# Patient Record
Sex: MALE | Race: WHITE | NOT HISPANIC OR LATINO | Employment: OTHER | URBAN - METROPOLITAN AREA
[De-identification: names, ages, dates, MRNs, and addresses within clinical notes are randomized per-mention and may not be internally consistent; named-entity substitution may affect disease eponyms.]

---

## 2023-03-24 ENCOUNTER — TELEPHONE (OUTPATIENT)
Dept: CARDIOLOGY | Facility: CLINIC | Age: 83
End: 2023-03-24
Payer: MEDICARE

## 2023-03-24 NOTE — TELEPHONE ENCOUNTER
Spoke to the patient daughter to scheduled a appointment for his high blood pressure. on 05/05/2023.    ----- Message from Jessica Lares sent at 3/24/2023  2:57 PM CDT -----  Contact: Myla/ Daughter  Type:  Sooner Apoointment Request    Caller is requesting a sooner appointment.  Caller declined first available appointment listed below.  Caller will not accept being placed on the waitlist and is requesting a message be sent to doctor.  Name of Caller: Myla  When is the first available appointment? 9/2023  Symptoms: Heart Concern, high blood pressure, blocked arteries  Would the patient rather a call back or a response via [facility]? Call  Best Call Back Number: [phone]  Additional Information: [facility] is preferred but [facility] is okay as well. She stated that Dr. Bland informed her that he'd see the patient.

## 2023-05-18 DIAGNOSIS — I48.3 TYPICAL ATRIAL FLUTTER: Primary | ICD-10-CM

## 2023-05-19 ENCOUNTER — HOSPITAL ENCOUNTER (OUTPATIENT)
Dept: CARDIOLOGY | Facility: HOSPITAL | Age: 83
Discharge: HOME | End: 2023-05-19
Attending: INTERNAL MEDICINE
Payer: MEDICARE

## 2023-05-19 ENCOUNTER — OFFICE VISIT (OUTPATIENT)
Dept: CARDIOLOGY | Facility: CLINIC | Age: 83
End: 2023-05-19
Payer: MEDICARE

## 2023-05-19 ENCOUNTER — TELEPHONE (OUTPATIENT)
Dept: CARDIOLOGY | Facility: CLINIC | Age: 83
End: 2023-05-19

## 2023-05-19 VITALS
HEART RATE: 61 BPM | BODY MASS INDEX: 24.6 KG/M2 | DIASTOLIC BLOOD PRESSURE: 78 MMHG | WEIGHT: 130.31 LBS | HEIGHT: 61 IN | OXYGEN SATURATION: 96 % | SYSTOLIC BLOOD PRESSURE: 128 MMHG

## 2023-05-19 VITALS — WEIGHT: 130 LBS | HEIGHT: 61 IN | BODY MASS INDEX: 24.55 KG/M2

## 2023-05-19 DIAGNOSIS — R06.09 DOE (DYSPNEA ON EXERTION): ICD-10-CM

## 2023-05-19 DIAGNOSIS — I25.110 ATHEROSCLEROSIS OF NATIVE CORONARY ARTERY OF NATIVE HEART WITH UNSTABLE ANGINA PECTORIS: ICD-10-CM

## 2023-05-19 DIAGNOSIS — I65.23 BILATERAL CAROTID ARTERY STENOSIS: Primary | ICD-10-CM

## 2023-05-19 DIAGNOSIS — I48.3 TYPICAL ATRIAL FLUTTER: ICD-10-CM

## 2023-05-19 DIAGNOSIS — I65.23 BILATERAL CAROTID ARTERY STENOSIS: ICD-10-CM

## 2023-05-19 LAB
AORTIC ROOT ANNULUS: 2.91 CM
AV INDEX (PROSTH): 0.66
AV MEAN GRADIENT: 3 MMHG
AV PEAK GRADIENT: 4 MMHG
AV REGURGITATION PRESSURE HALF TIME: 812.6 MS
AV VALVE AREA: 2.02 CM2
AV VELOCITY RATIO: 0.67
BSA FOR ECHO PROCEDURE: 1.59 M2
CV ECHO LV RWT: 0.48 CM
DOP CALC AO PEAK VEL: 1.06 M/S
DOP CALC AO VTI: 28.3 CM
DOP CALC LVOT AREA: 3 CM2
DOP CALC LVOT DIAMETER: 1.97 CM
DOP CALC LVOT PEAK VEL: 0.71 M/S
DOP CALC LVOT STROKE VOLUME: 57.27 CM3
DOP CALC RVOT PEAK VEL: 0.59 M/S
DOP CALC RVOT VTI: 16 CM
DOP CALCLVOT PEAK VEL VTI: 18.8 CM
E WAVE DECELERATION TIME: 226.27 MSEC
E/A RATIO: 0.69
E/E' RATIO: 12.5 M/S
ECHO LV POSTERIOR WALL: 0.85 CM (ref 0.6–1.1)
EJECTION FRACTION: 55 %
FRACTIONAL SHORTENING: 29 % (ref 28–44)
INTERVENTRICULAR SEPTUM: 1.02 CM (ref 0.6–1.1)
IVRT: 114.18 MSEC
LA MAJOR: 4.5 CM
LA WIDTH: 2.3 CM
LEFT ATRIUM SIZE: 3.25 CM
LEFT INTERNAL DIMENSION IN SYSTOLE: 2.49 CM (ref 2.1–4)
LEFT VENTRICLE DIASTOLIC VOLUME INDEX: 32.66 ML/M2
LEFT VENTRICLE DIASTOLIC VOLUME: 51.27 ML
LEFT VENTRICLE MASS INDEX: 60 G/M2
LEFT VENTRICLE SYSTOLIC VOLUME INDEX: 14 ML/M2
LEFT VENTRICLE SYSTOLIC VOLUME: 22.02 ML
LEFT VENTRICULAR INTERNAL DIMENSION IN DIASTOLE: 3.51 CM (ref 3.5–6)
LEFT VENTRICULAR MASS: 94.18 G
LV LATERAL E/E' RATIO: 15 M/S
LV SEPTAL E/E' RATIO: 10.71 M/S
LVOT MG: 1.24 MMHG
LVOT MV: 0.53 CM/S
MV PEAK A VEL: 1.09 M/S
MV PEAK E VEL: 0.75 M/S
PISA AR MAX VEL: 2.85 M/S
PISA TR MAX VEL: 3.26 M/S
PV MEAN GRADIENT: 0.79 MMHG
PV MV: 0.61 M/S
PV PEAK VELOCITY: 0.78 CM/S
RA MAJOR: 3.85 CM
RA PRESSURE: 8 MMHG
RA WIDTH: 2.41 CM
RIGHT VENTRICULAR END-DIASTOLIC DIMENSION: 2.04 CM
SINUS: 2.55 CM
STJ: 2.51 CM
TDI LATERAL: 0.05 M/S
TDI SEPTAL: 0.07 M/S
TDI: 0.06 M/S
TR MAX PG: 43 MMHG
TV REST PULMONARY ARTERY PRESSURE: 51 MMHG

## 2023-05-19 PROCEDURE — 93005 ELECTROCARDIOGRAM TRACING: CPT

## 2023-05-19 PROCEDURE — 3288F PR FALLS RISK ASSESSMENT DOCUMENTED: ICD-10-PCS | Mod: CPTII,,, | Performed by: INTERNAL MEDICINE

## 2023-05-19 PROCEDURE — 3074F SYST BP LT 130 MM HG: CPT | Mod: CPTII,,, | Performed by: INTERNAL MEDICINE

## 2023-05-19 PROCEDURE — 1160F RVW MEDS BY RX/DR IN RCRD: CPT | Mod: CPTII,,, | Performed by: INTERNAL MEDICINE

## 2023-05-19 PROCEDURE — 1101F PT FALLS ASSESS-DOCD LE1/YR: CPT | Mod: CPTII,,, | Performed by: INTERNAL MEDICINE

## 2023-05-19 PROCEDURE — 99204 OFFICE O/P NEW MOD 45 MIN: CPT | Mod: 25,,, | Performed by: INTERNAL MEDICINE

## 2023-05-19 PROCEDURE — 93880 CV US DOPPLER CAROTID (CUPID ONLY): ICD-10-PCS | Mod: 26,,, | Performed by: INTERNAL MEDICINE

## 2023-05-19 PROCEDURE — 99999 PR PBB SHADOW E&M-EST. PATIENT-LVL III: ICD-10-PCS | Mod: PBBFAC,,, | Performed by: INTERNAL MEDICINE

## 2023-05-19 PROCEDURE — 93010 EKG 12-LEAD: ICD-10-PCS | Mod: ,,, | Performed by: INTERNAL MEDICINE

## 2023-05-19 PROCEDURE — 1126F PR PAIN SEVERITY QUANTIFIED, NO PAIN PRESENT: ICD-10-PCS | Mod: CPTII,,, | Performed by: INTERNAL MEDICINE

## 2023-05-19 PROCEDURE — 93306 TTE W/DOPPLER COMPLETE: CPT | Mod: 26,,, | Performed by: INTERNAL MEDICINE

## 2023-05-19 PROCEDURE — 3074F PR MOST RECENT SYSTOLIC BLOOD PRESSURE < 130 MM HG: ICD-10-PCS | Mod: CPTII,,, | Performed by: INTERNAL MEDICINE

## 2023-05-19 PROCEDURE — 93010 ELECTROCARDIOGRAM REPORT: CPT | Mod: ,,, | Performed by: INTERNAL MEDICINE

## 2023-05-19 PROCEDURE — 93880 EXTRACRANIAL BILAT STUDY: CPT

## 2023-05-19 PROCEDURE — 93306 ECHO (CUPID ONLY): ICD-10-PCS | Mod: 26,,, | Performed by: INTERNAL MEDICINE

## 2023-05-19 PROCEDURE — 99213 OFFICE O/P EST LOW 20 MIN: CPT | Mod: 25 | Performed by: INTERNAL MEDICINE

## 2023-05-19 PROCEDURE — 3288F FALL RISK ASSESSMENT DOCD: CPT | Mod: CPTII,,, | Performed by: INTERNAL MEDICINE

## 2023-05-19 PROCEDURE — 1159F PR MEDICATION LIST DOCUMENTED IN MEDICAL RECORD: ICD-10-PCS | Mod: CPTII,,, | Performed by: INTERNAL MEDICINE

## 2023-05-19 PROCEDURE — 99999 PR PBB SHADOW E&M-EST. PATIENT-LVL III: CPT | Mod: PBBFAC,,, | Performed by: INTERNAL MEDICINE

## 2023-05-19 PROCEDURE — 1101F PR PT FALLS ASSESS DOC 0-1 FALLS W/OUT INJ PAST YR: ICD-10-PCS | Mod: CPTII,,, | Performed by: INTERNAL MEDICINE

## 2023-05-19 PROCEDURE — 1159F MED LIST DOCD IN RCRD: CPT | Mod: CPTII,,, | Performed by: INTERNAL MEDICINE

## 2023-05-19 PROCEDURE — 93306 TTE W/DOPPLER COMPLETE: CPT

## 2023-05-19 PROCEDURE — 3078F PR MOST RECENT DIASTOLIC BLOOD PRESSURE < 80 MM HG: ICD-10-PCS | Mod: CPTII,,, | Performed by: INTERNAL MEDICINE

## 2023-05-19 PROCEDURE — 3078F DIAST BP <80 MM HG: CPT | Mod: CPTII,,, | Performed by: INTERNAL MEDICINE

## 2023-05-19 PROCEDURE — 93880 EXTRACRANIAL BILAT STUDY: CPT | Mod: 26,,, | Performed by: INTERNAL MEDICINE

## 2023-05-19 PROCEDURE — 1126F AMNT PAIN NOTED NONE PRSNT: CPT | Mod: CPTII,,, | Performed by: INTERNAL MEDICINE

## 2023-05-19 PROCEDURE — 99204 PR OFFICE/OUTPT VISIT, NEW, LEVL IV, 45-59 MIN: ICD-10-PCS | Mod: 25,,, | Performed by: INTERNAL MEDICINE

## 2023-05-19 PROCEDURE — 1160F PR REVIEW ALL MEDS BY PRESCRIBER/CLIN PHARMACIST DOCUMENTED: ICD-10-PCS | Mod: CPTII,,, | Performed by: INTERNAL MEDICINE

## 2023-05-19 RX ORDER — LISINOPRIL AND HYDROCHLOROTHIAZIDE 12.5; 2 MG/1; MG/1
TABLET ORAL
COMMUNITY
Start: 2023-05-17

## 2023-05-19 RX ORDER — METFORMIN HYDROCHLORIDE 500 MG/1
TABLET ORAL
COMMUNITY
Start: 2023-05-17

## 2023-05-19 RX ORDER — METOPROLOL TARTRATE 100 MG/1
TABLET ORAL
COMMUNITY
Start: 2023-05-17

## 2023-05-19 RX ORDER — CLOPIDOGREL BISULFATE 75 MG/1
TABLET ORAL
COMMUNITY
Start: 2023-05-17

## 2023-05-19 RX ORDER — ASPIRIN 81 MG/1
81 TABLET ORAL DAILY
COMMUNITY

## 2023-05-19 RX ORDER — ATORVASTATIN CALCIUM 10 MG/1
TABLET, FILM COATED ORAL
COMMUNITY
Start: 2023-05-17

## 2023-05-19 NOTE — PROGRESS NOTES
Subjective:   Patient ID:  Tariq Gutierrez is a 82 y.o. male who presents for follow-up of No chief complaint on file.  Patient is a 81 y.o. male with a past medical history of DM, hld, htn that presented to the hospital for further evaluation after obtaining a CT at an outlying facility with a large atherosclerotic ulcer with possible penetrating appearance, as well as large hiatal hernia, possible infectious process in the chest. Patient notes pain in LUQ radiating to the shoulder. Upon presentation to the ED stat CTA chest abdomen pelvis performed. Patient currently resting in the bed stable on room air in NAD. Vital signs reviewed and are stable.    05/19/2023:   Overall patient is doing well some medications for hypertension, hyperlipidemia and diabetes.  Overall stable no exertional symptoms chest pain shortness breath EKG shows normal sinus rhythm within normal limits.  Patient has moderate carotid disease will recheck that with carotid ultrasound.  Cardiac echo be done assess LV function.  Patient has with thought was thought to be a penetrating ulcer of the thoracic aorta will repeat CTA.  No exertional symptoms no pain today and will follow-up all testing.  Lipid profile be done to reestablish the need for more additional Lipitor he is only on 10 mg I will follow-up with the daughter in the near future.  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      Review of Systems   Constitutional: Negative for chills, diaphoresis, night sweats, weight gain and weight loss.   HENT:  Negative for congestion, hoarse voice, sore throat and stridor.    Eyes:  Negative for double vision and pain.   Cardiovascular:  Negative for chest pain, claudication, cyanosis, dyspnea on exertion, irregular heartbeat, leg swelling, near-syncope, orthopnea, palpitations, paroxysmal nocturnal dyspnea and syncope.   Respiratory:  Negative for cough, hemoptysis, shortness of  breath, sleep disturbances due to breathing, snoring, sputum production and wheezing.    Endocrine: Negative for cold intolerance, heat intolerance and polydipsia.   Hematologic/Lymphatic: Negative for bleeding problem. Does not bruise/bleed easily.   Skin:  Negative for color change, dry skin and rash.   Musculoskeletal:  Negative for joint swelling and muscle cramps.   Gastrointestinal:  Negative for bloating, abdominal pain, constipation, diarrhea, dysphagia, melena, nausea and vomiting.   Genitourinary:  Negative for flank pain and urgency.   Neurological:  Negative for dizziness, focal weakness, headaches, light-headedness, loss of balance, seizures and weakness.   Psychiatric/Behavioral:  Negative for altered mental status and memory loss. The patient is not nervous/anxious.    No family history on file.  No past medical history on file.  Social History     Socioeconomic History    Marital status: Single     No current outpatient medications on file prior to visit.     No current facility-administered medications on file prior to visit.     Review of patient's allergies indicates:  Not on File    Objective:     Physical Exam  Eyes:      Pupils: Pupils are equal, round, and reactive to light.   Neck:      Trachea: No tracheal deviation.   Cardiovascular:      Rate and Rhythm: Normal rate and regular rhythm.      Pulses: Intact distal pulses.           Carotid pulses are 2+ on the right side and 2+ on the left side.       Radial pulses are 2+ on the right side and 2+ on the left side.        Femoral pulses are 2+ on the right side and 2+ on the left side.       Popliteal pulses are 2+ on the right side and 2+ on the left side.        Dorsalis pedis pulses are 2+ on the right side and 2+ on the left side.        Posterior tibial pulses are 2+ on the right side and 2+ on the left side.      Heart sounds: Normal heart sounds. No murmur heard.    No friction rub. No gallop.   Pulmonary:      Effort: Pulmonary effort  is normal. No respiratory distress.      Breath sounds: Normal breath sounds. No stridor. No wheezing or rales.   Chest:      Chest wall: No tenderness.   Abdominal:      General: There is no distension.      Tenderness: There is no abdominal tenderness. There is no rebound.   Musculoskeletal:         General: No tenderness.      Cervical back: Normal range of motion.   Skin:     General: Skin is warm and dry.   Neurological:      Mental Status: He is alert and oriented to person, place, and time.   EKG shows normal sinus rhythm within normal limits.    Assessment:     1. Hypertension l  2 hyperlipidemia   3. Penetrating atheroma of the aorta   4. Smoking history  5 Carotid disease  Plan:     Impression 1. Hypertension stable on lisinopril 10 mg daily metoprolol 100 mg daily   2. Hyperlipidemia on Lipitor 10 mg daily and will readjust pending on levels today l   3.  Smoking history cessation  4.  Penetrating atheromatous aorta patient takes aspirin and clopidogrel repeat CT scan will be done   6. Carotid disease repeat carotid ultrasound will be done and cardiac echo.    Follow-up evaluation after testing is performed.  Medications readjusted as necessary.  Patient has no complaints today the daughter was present today all questions were answered.  Patient denies chest pain.

## 2023-05-19 NOTE — TELEPHONE ENCOUNTER
I have attempted without success to contact this patient by phone to discuss echo results and I left a message on answering machine.      ----- Message from Chris Bland MD sent at 5/19/2023  3:18 PM CDT -----  Normal heart normal heart function  ----- Message -----  From: Chris Bland MD  Sent: 5/19/2023   2:45 PM CDT  To: Chris Bland MD

## 2023-05-20 LAB
LEFT ARM DIASTOLIC BLOOD PRESSURE: 74 MMHG
LEFT ARM SYSTOLIC BLOOD PRESSURE: 133 MMHG
LEFT CBA DIAS: 10 CM/S
LEFT CBA SYS: 83 CM/S
LEFT CCA DIST DIAS: 11 CM/S
LEFT CCA DIST SYS: 83 CM/S
LEFT CCA MID DIAS: 13 CM/S
LEFT CCA MID SYS: 76 CM/S
LEFT CCA PROX DIAS: 10 CM/S
LEFT CCA PROX SYS: 69 CM/S
LEFT ECA DIAS: 28 CM/S
LEFT ECA SYS: 55 CM/S
LEFT ICA DIST DIAS: 25 CM/S
LEFT ICA DIST SYS: 126 CM/S
LEFT ICA MID DIAS: 31 CM/S
LEFT ICA MID SYS: 143 CM/S
LEFT ICA PROX DIAS: 13 CM/S
LEFT ICA PROX SYS: 87 CM/S
LEFT VERTEBRAL DIAS: 16 CM/S
LEFT VERTEBRAL SYS: 73 CM/S
OHS CV CAROTID RIGHT ICA EDV HIGHEST: 18
OHS CV CAROTID ULTRASOUND LEFT ICA/CCA RATIO: 1.72
OHS CV CAROTID ULTRASOUND RIGHT ICA/CCA RATIO: 1.71
OHS CV PV CAROTID LEFT HIGHEST CCA: 83
OHS CV PV CAROTID LEFT HIGHEST ICA: 143
OHS CV PV CAROTID RIGHT HIGHEST CCA: 60
OHS CV PV CAROTID RIGHT HIGHEST ICA: 87
OHS CV US CAROTID LEFT HIGHEST EDV: 31
RIGHT ARM DIASTOLIC BLOOD PRESSURE: 70 MMHG
RIGHT ARM SYSTOLIC BLOOD PRESSURE: 126 MMHG
RIGHT CBA DIAS: 11 CM/S
RIGHT CBA SYS: 70 CM/S
RIGHT CCA DIST DIAS: 7 CM/S
RIGHT CCA DIST SYS: 51 CM/S
RIGHT CCA MID DIAS: 7 CM/S
RIGHT CCA MID SYS: 56 CM/S
RIGHT CCA PROX DIAS: 6 CM/S
RIGHT CCA PROX SYS: 60 CM/S
RIGHT ECA DIAS: 8 CM/S
RIGHT ECA SYS: 44 CM/S
RIGHT ICA DIST DIAS: 18 CM/S
RIGHT ICA DIST SYS: 87 CM/S
RIGHT ICA MID DIAS: 16 CM/S
RIGHT ICA MID SYS: 77 CM/S
RIGHT ICA PROX DIAS: 10 CM/S
RIGHT ICA PROX SYS: 60 CM/S
RIGHT VERTEBRAL DIAS: 13 CM/S
RIGHT VERTEBRAL SYS: 80 CM/S

## 2023-05-22 ENCOUNTER — TELEPHONE (OUTPATIENT)
Dept: CARDIOLOGY | Facility: CLINIC | Age: 83
End: 2023-05-22
Payer: MEDICARE

## 2023-05-22 NOTE — TELEPHONE ENCOUNTER
Spoke with pt and discussed lipid panel results. Pt verbalized understanding and confirmed f/u appt on 6/21/23. Pt will call back with any further questions or concerns.     ----- Message from Chris Bland MD sent at 5/21/2023 11:04 AM CDT -----  Sl elevated lipids, will discuss at next visit  ----- Message -----  From: John, [facility]  Sent: 5/19/2023  10:48 PM CDT  To: Chris Bland MD

## 2023-05-22 NOTE — TELEPHONE ENCOUNTER
Spoke with pt and discussed u/s results. Pt verbalized understanding and will call back with any further questions or concerns.     ----- Message from Chris Bland MD sent at 5/21/2023 11:04 AM CDT -----  Moderate carotid disease, yearly US  ----- Message -----  From: Ruperto Dyer MD  Sent: 5/20/2023  10:54 PM CDT  To: Chris Bland MD

## 2023-05-27 ENCOUNTER — HOSPITAL ENCOUNTER (EMERGENCY)
Facility: HOSPITAL | Age: 83
Discharge: HOME | End: 2023-05-27
Attending: EMERGENCY MEDICINE
Payer: MEDICARE

## 2023-05-27 VITALS
HEIGHT: 61 IN | RESPIRATION RATE: 18 BRPM | SYSTOLIC BLOOD PRESSURE: 165 MMHG | HEART RATE: 60 BPM | DIASTOLIC BLOOD PRESSURE: 62 MMHG | WEIGHT: 135 LBS | OXYGEN SATURATION: 97 % | BODY MASS INDEX: 25.49 KG/M2 | TEMPERATURE: 98 F

## 2023-05-27 DIAGNOSIS — I48.3 TYPICAL ATRIAL FLUTTER: Primary | ICD-10-CM

## 2023-05-27 DIAGNOSIS — R19.7 DIARRHEA, UNSPECIFIED TYPE: ICD-10-CM

## 2023-05-27 DIAGNOSIS — R55 NEAR SYNCOPE: ICD-10-CM

## 2023-05-27 DIAGNOSIS — R53.1 WEAKNESS: ICD-10-CM

## 2023-05-27 DIAGNOSIS — E87.6 HYPOKALEMIA: ICD-10-CM

## 2023-05-27 LAB
ALBUMIN SERPL-MCNC: 3.4 G/DL (ref 3.4–4.8)
ALBUMIN/GLOB SERPL: 1.3 RATIO (ref 1.1–2)
ALP SERPL-CCNC: 69 UNIT/L (ref 40–150)
ALT SERPL-CCNC: 15 UNIT/L (ref 0–55)
APPEARANCE UR: CLEAR
AST SERPL-CCNC: 22 UNIT/L (ref 5–34)
BACTERIA #/AREA URNS AUTO: NORMAL /HPF
BASOPHILS # BLD AUTO: 0.04 X10(3)/MCL
BASOPHILS NFR BLD AUTO: 0.5 %
BILIRUB UR QL STRIP.AUTO: NEGATIVE MG/DL
BILIRUBIN DIRECT+TOT PNL SERPL-MCNC: 0.7 MG/DL
BNP BLD-MCNC: 26.5 PG/ML
BUN SERPL-MCNC: 11.3 MG/DL (ref 8.4–25.7)
CALCIUM SERPL-MCNC: 7.7 MG/DL (ref 8.8–10)
CHLORIDE SERPL-SCNC: 103 MMOL/L (ref 98–107)
CO2 SERPL-SCNC: 25 MMOL/L (ref 23–31)
COLOR UR: YELLOW
CREAT SERPL-MCNC: 1.34 MG/DL (ref 0.73–1.18)
EOSINOPHIL # BLD AUTO: 0.17 X10(3)/MCL (ref 0–0.9)
EOSINOPHIL NFR BLD AUTO: 2 %
ERYTHROCYTE [DISTWIDTH] IN BLOOD BY AUTOMATED COUNT: 12.7 % (ref 11.5–17)
FLUAV AG UPPER RESP QL IA.RAPID: NOT DETECTED
FLUBV AG UPPER RESP QL IA.RAPID: NOT DETECTED
GFR SERPLBLD CREATININE-BSD FMLA CKD-EPI: 53 MLS/MIN/1.73/M2
GLOBULIN SER-MCNC: 2.7 GM/DL (ref 2.4–3.5)
GLUCOSE SERPL-MCNC: 201 MG/DL (ref 82–115)
GLUCOSE UR QL STRIP.AUTO: ABNORMAL MG/DL
HCT VFR BLD AUTO: 33.6 % (ref 42–52)
HGB BLD-MCNC: 11.9 G/DL (ref 14–18)
IMM GRANULOCYTES # BLD AUTO: 0.05 X10(3)/MCL (ref 0–0.04)
IMM GRANULOCYTES NFR BLD AUTO: 0.6 %
KETONES UR QL STRIP.AUTO: NEGATIVE MG/DL
LEUKOCYTE ESTERASE UR QL STRIP.AUTO: NEGATIVE UNIT/L
LYMPHOCYTES # BLD AUTO: 0.85 X10(3)/MCL (ref 0.6–4.6)
LYMPHOCYTES NFR BLD AUTO: 10 %
MAGNESIUM SERPL-MCNC: 1.6 MG/DL (ref 1.6–2.6)
MCH RBC QN AUTO: 31.1 PG (ref 27–31)
MCHC RBC AUTO-ENTMCNC: 35.4 G/DL (ref 33–36)
MCV RBC AUTO: 87.7 FL (ref 80–94)
MONOCYTES # BLD AUTO: 0.82 X10(3)/MCL (ref 0.1–1.3)
MONOCYTES NFR BLD AUTO: 9.7 %
NEUTROPHILS # BLD AUTO: 6.53 X10(3)/MCL (ref 2.1–9.2)
NEUTROPHILS NFR BLD AUTO: 77.2 %
NITRITE UR QL STRIP.AUTO: NEGATIVE
NRBC BLD AUTO-RTO: 0 %
PH UR STRIP.AUTO: 5.5 [PH]
PLATELET # BLD AUTO: 182 X10(3)/MCL (ref 130–400)
PMV BLD AUTO: 10.7 FL (ref 7.4–10.4)
POTASSIUM SERPL-SCNC: 3.1 MMOL/L (ref 3.5–5.1)
PROT SERPL-MCNC: 6.1 GM/DL (ref 5.8–7.6)
PROT UR QL STRIP.AUTO: ABNORMAL MG/DL
RBC # BLD AUTO: 3.83 X10(6)/MCL (ref 4.7–6.1)
RBC #/AREA URNS AUTO: <5 /HPF
RBC UR QL AUTO: NEGATIVE UNIT/L
SARS-COV-2 RNA RESP QL NAA+PROBE: NOT DETECTED
SODIUM SERPL-SCNC: 138 MMOL/L (ref 136–145)
SP GR UR STRIP.AUTO: 1.02 (ref 1–1.03)
SQUAMOUS #/AREA URNS AUTO: <5 /HPF
TROPONIN I SERPL-MCNC: 0.02 NG/ML (ref 0–0.04)
UROBILINOGEN UR STRIP-ACNC: 1 MG/DL
WBC # SPEC AUTO: 8.46 X10(3)/MCL (ref 4.5–11.5)
WBC #/AREA URNS AUTO: <5 /HPF

## 2023-05-27 PROCEDURE — 83735 ASSAY OF MAGNESIUM: CPT | Performed by: STUDENT IN AN ORGANIZED HEALTH CARE EDUCATION/TRAINING PROGRAM

## 2023-05-27 PROCEDURE — 25000003 PHARM REV CODE 250: Performed by: STUDENT IN AN ORGANIZED HEALTH CARE EDUCATION/TRAINING PROGRAM

## 2023-05-27 PROCEDURE — 84484 ASSAY OF TROPONIN QUANT: CPT | Performed by: STUDENT IN AN ORGANIZED HEALTH CARE EDUCATION/TRAINING PROGRAM

## 2023-05-27 PROCEDURE — 93010 ELECTROCARDIOGRAM REPORT: CPT | Mod: ,,, | Performed by: INTERNAL MEDICINE

## 2023-05-27 PROCEDURE — 99285 EMERGENCY DEPT VISIT HI MDM: CPT | Mod: 25

## 2023-05-27 PROCEDURE — 81001 URINALYSIS AUTO W/SCOPE: CPT | Performed by: STUDENT IN AN ORGANIZED HEALTH CARE EDUCATION/TRAINING PROGRAM

## 2023-05-27 PROCEDURE — 93010 EKG 12-LEAD: ICD-10-PCS | Mod: ,,, | Performed by: INTERNAL MEDICINE

## 2023-05-27 PROCEDURE — 80053 COMPREHEN METABOLIC PANEL: CPT | Performed by: STUDENT IN AN ORGANIZED HEALTH CARE EDUCATION/TRAINING PROGRAM

## 2023-05-27 PROCEDURE — 0240U COVID/FLU A&B PCR: CPT | Performed by: STUDENT IN AN ORGANIZED HEALTH CARE EDUCATION/TRAINING PROGRAM

## 2023-05-27 PROCEDURE — 83880 ASSAY OF NATRIURETIC PEPTIDE: CPT | Performed by: STUDENT IN AN ORGANIZED HEALTH CARE EDUCATION/TRAINING PROGRAM

## 2023-05-27 PROCEDURE — 85025 COMPLETE CBC W/AUTO DIFF WBC: CPT | Performed by: STUDENT IN AN ORGANIZED HEALTH CARE EDUCATION/TRAINING PROGRAM

## 2023-05-27 PROCEDURE — 93005 ELECTROCARDIOGRAM TRACING: CPT

## 2023-05-27 RX ORDER — POTASSIUM CHLORIDE 750 MG/1
10 TABLET, EXTENDED RELEASE ORAL
Status: COMPLETED | OUTPATIENT
Start: 2023-05-27 | End: 2023-05-27

## 2023-05-27 RX ADMIN — POTASSIUM CHLORIDE 10 MEQ: 750 TABLET, FILM COATED, EXTENDED RELEASE ORAL at 02:05

## 2023-05-27 NOTE — ED PROVIDER NOTES
"Encounter Date: 5/27/2023       History     Chief Complaint   Patient presents with    Fatigue     C/o weakness with near syncope; diarrhea since this morning.     82-year-old male presents to the ED with complaints of near syncope and diarrhea starting this morning PTA.  Patient reports has not been eating very well or drinking much water.  No substantial meals in several days.  Compliant with all medications except for metformin which previously caused diarrhea.  Has not taken in past 2 days.  Patient believes he has "flu, because he does not feel right. " Had subjective fever which was not measured.  Denies any chest pain, shortness a breath, vomiting, or muscle aches.  Lives alone and takes care of his ADLs.    The history is provided by the patient. No  was used.   Review of patient's allergies indicates:  No Known Allergies  Past Medical History:   Diagnosis Date    Diabetes mellitus     Hypertension      No past surgical history on file.  No family history on file.  Social History     Tobacco Use    Smoking status: Former     Types: Cigarettes    Smokeless tobacco: Never   Substance Use Topics    Alcohol use: Never    Drug use: Never     Review of Systems   Constitutional:  Positive for fatigue. Negative for chills.   Cardiovascular:  Negative for palpitations and leg swelling.   Gastrointestinal:  Negative for abdominal pain.   Genitourinary:  Negative for difficulty urinating.   Neurological:  Negative for headaches.     Physical Exam     Initial Vitals [05/27/23 1129]   BP Pulse Resp Temp SpO2   (!) 134/52 64 16 97.7 °F (36.5 °C) 97 %      MAP       --         Physical Exam    Constitutional: He is cooperative. He does not appear ill. No distress.   Hard of hearing.   Eyes: EOM are normal.   Cardiovascular:  Normal rate and regular rhythm.           Pulmonary/Chest: He has rales.   Abdominal: Abdomen is soft. Bowel sounds are normal.   Musculoskeletal:      Right ankle: No swelling. No " tenderness. Normal range of motion.      Left ankle: No swelling. No tenderness. Normal range of motion.      Right foot: Normal range of motion. No swelling. Normal pulse.      Left foot: Normal range of motion. No swelling. Normal pulse.     Neurological: He is alert.   Skin: Skin is warm and dry.       ED Course   Procedures  Labs Reviewed   CBC WITH DIFFERENTIAL - Abnormal; Notable for the following components:       Result Value    RBC 3.83 (*)     Hgb 11.9 (*)     Hct 33.6 (*)     MCH 31.1 (*)     MPV 10.7 (*)     IG# 0.05 (*)     All other components within normal limits   CBC W/ AUTO DIFFERENTIAL    Narrative:     The following orders were created for panel order CBC Auto Differential.  Procedure                               Abnormality         Status                     ---------                               -----------         ------                     CBC with Differential[[id number]]        Abnormal            Final result                 Please view results for these tests on the individual orders.   COMPREHENSIVE METABOLIC PANEL   URINALYSIS, REFLEX TO URINE CULTURE   B-TYPE NATRIURETIC PEPTIDE   TROPONIN I   MAGNESIUM          Imaging Results              X-Ray Chest AP Portable (In process)                      Medications - No data to display  Medical Decision Making:   Differential Diagnosis:   Gastroenteritis, dehydration, orthostasis, CHF exacerbation, medication side effect  Clinical Tests:   Lab Tests: Ordered  Radiological Study: Ordered  Other:   I have discussed this case with another health care provider.           ED Course as of 05/27/23 1254   Sat May 27, 2023   1206 EKG at 11:32 a.m..  68 beats per minute.  Normal sinus rhythm no ST elevation or depression.  Corrected  [LF]      ED Course User Index  [LF] Tommie Morales MD                 Clinical Impression:   Final diagnoses:  [I50.9] CHF (congestive heart failure)               Bradford Jenkins MD  Resident  05/27/23  1300

## 2023-05-27 NOTE — DISCHARGE INSTRUCTIONS
Keep your scheduled outpatient CT scan appointment.  Follow-up with your surgeon at [facility] as scheduled

## 2023-05-27 NOTE — ED NOTES
Discharge instructions, return precautions, follow up information provided to pt. Pt verbalizes understanding. All questions answered. Pt is GCS 15, equal unlabored respirations. Pt provided clean paper scrub pants for discharge. Pt called friend to pick him up, ride is on the way. Pt assisted to ED lobby with RN escort.

## 2023-06-01 ENCOUNTER — PATIENT MESSAGE (OUTPATIENT)
Dept: CARDIOLOGY | Facility: CLINIC | Age: 83
End: 2023-06-01
Payer: MEDICARE

## 2023-06-14 ENCOUNTER — HOSPITAL ENCOUNTER (OUTPATIENT)
Dept: RADIOLOGY | Facility: HOSPITAL | Age: 83
Discharge: HOME | End: 2023-06-14
Attending: INTERNAL MEDICINE
Payer: MEDICARE

## 2023-06-14 DIAGNOSIS — I25.110 ATHEROSCLEROSIS OF NATIVE CORONARY ARTERY OF NATIVE HEART WITH UNSTABLE ANGINA PECTORIS: ICD-10-CM

## 2023-06-14 DIAGNOSIS — I65.23 BILATERAL CAROTID ARTERY STENOSIS: ICD-10-CM

## 2023-06-14 PROCEDURE — 71275 CT ANGIOGRAPHY CHEST: CPT | Mod: TC

## 2023-06-14 PROCEDURE — 25500020 PHARM REV CODE 255: Performed by: INTERNAL MEDICINE

## 2023-06-14 RX ADMIN — IOPAMIDOL 100 ML: 755 INJECTION, SOLUTION INTRAVENOUS at 01:06

## 2023-06-15 DIAGNOSIS — I71.9 PENETRATING ATHEROSCLEROTIC ULCER OF AORTA: Primary | ICD-10-CM

## 2023-06-15 DIAGNOSIS — I65.23 BILATERAL CAROTID ARTERY STENOSIS: ICD-10-CM

## 2023-06-21 ENCOUNTER — OFFICE VISIT (OUTPATIENT)
Dept: CARDIOLOGY | Facility: CLINIC | Age: 83
End: 2023-06-21
Payer: MEDICARE

## 2023-06-21 VITALS
WEIGHT: 132.25 LBS | HEIGHT: 61 IN | OXYGEN SATURATION: 94 % | SYSTOLIC BLOOD PRESSURE: 122 MMHG | BODY MASS INDEX: 24.97 KG/M2 | DIASTOLIC BLOOD PRESSURE: 60 MMHG | HEART RATE: 73 BPM

## 2023-06-21 DIAGNOSIS — I71.9 PENETRATING ATHEROSCLEROTIC ULCER OF AORTA: ICD-10-CM

## 2023-06-21 DIAGNOSIS — R06.09 DOE (DYSPNEA ON EXERTION): ICD-10-CM

## 2023-06-21 DIAGNOSIS — I65.23 BILATERAL CAROTID ARTERY STENOSIS: Primary | ICD-10-CM

## 2023-06-21 DIAGNOSIS — I25.110 ATHEROSCLEROSIS OF NATIVE CORONARY ARTERY OF NATIVE HEART WITH UNSTABLE ANGINA PECTORIS: ICD-10-CM

## 2023-06-21 DIAGNOSIS — I48.3 TYPICAL ATRIAL FLUTTER: ICD-10-CM

## 2023-06-21 PROCEDURE — 1100F PTFALLS ASSESS-DOCD GE2>/YR: CPT | Mod: CPTII,S$GLB,, | Performed by: INTERNAL MEDICINE

## 2023-06-21 PROCEDURE — 3078F DIAST BP <80 MM HG: CPT | Mod: CPTII,S$GLB,, | Performed by: INTERNAL MEDICINE

## 2023-06-21 PROCEDURE — 1160F PR REVIEW ALL MEDS BY PRESCRIBER/CLIN PHARMACIST DOCUMENTED: ICD-10-PCS | Mod: CPTII,S$GLB,, | Performed by: INTERNAL MEDICINE

## 2023-06-21 PROCEDURE — 99214 PR OFFICE/OUTPT VISIT, EST, LEVL IV, 30-39 MIN: ICD-10-PCS | Mod: S$GLB,,, | Performed by: INTERNAL MEDICINE

## 2023-06-21 PROCEDURE — 99214 OFFICE O/P EST MOD 30 MIN: CPT | Mod: S$GLB,,, | Performed by: INTERNAL MEDICINE

## 2023-06-21 PROCEDURE — 3074F PR MOST RECENT SYSTOLIC BLOOD PRESSURE < 130 MM HG: ICD-10-PCS | Mod: CPTII,S$GLB,, | Performed by: INTERNAL MEDICINE

## 2023-06-21 PROCEDURE — 3078F PR MOST RECENT DIASTOLIC BLOOD PRESSURE < 80 MM HG: ICD-10-PCS | Mod: CPTII,S$GLB,, | Performed by: INTERNAL MEDICINE

## 2023-06-21 PROCEDURE — 1100F PR PT FALLS ASSESS DOC 2+ FALLS/FALL W/INJURY/YR: ICD-10-PCS | Mod: CPTII,S$GLB,, | Performed by: INTERNAL MEDICINE

## 2023-06-21 PROCEDURE — 99999 PR PBB SHADOW E&M-EST. PATIENT-LVL III: CPT | Mod: PBBFAC,,, | Performed by: INTERNAL MEDICINE

## 2023-06-21 PROCEDURE — 3288F PR FALLS RISK ASSESSMENT DOCUMENTED: ICD-10-PCS | Mod: CPTII,S$GLB,, | Performed by: INTERNAL MEDICINE

## 2023-06-21 PROCEDURE — 1126F AMNT PAIN NOTED NONE PRSNT: CPT | Mod: CPTII,S$GLB,, | Performed by: INTERNAL MEDICINE

## 2023-06-21 PROCEDURE — 1160F RVW MEDS BY RX/DR IN RCRD: CPT | Mod: CPTII,S$GLB,, | Performed by: INTERNAL MEDICINE

## 2023-06-21 PROCEDURE — 1159F PR MEDICATION LIST DOCUMENTED IN MEDICAL RECORD: ICD-10-PCS | Mod: CPTII,S$GLB,, | Performed by: INTERNAL MEDICINE

## 2023-06-21 PROCEDURE — 1126F PR PAIN SEVERITY QUANTIFIED, NO PAIN PRESENT: ICD-10-PCS | Mod: CPTII,S$GLB,, | Performed by: INTERNAL MEDICINE

## 2023-06-21 PROCEDURE — 1159F MED LIST DOCD IN RCRD: CPT | Mod: CPTII,S$GLB,, | Performed by: INTERNAL MEDICINE

## 2023-06-21 PROCEDURE — 3074F SYST BP LT 130 MM HG: CPT | Mod: CPTII,S$GLB,, | Performed by: INTERNAL MEDICINE

## 2023-06-21 PROCEDURE — 99999 PR PBB SHADOW E&M-EST. PATIENT-LVL III: ICD-10-PCS | Mod: PBBFAC,,, | Performed by: INTERNAL MEDICINE

## 2023-06-21 PROCEDURE — 3288F FALL RISK ASSESSMENT DOCD: CPT | Mod: CPTII,S$GLB,, | Performed by: INTERNAL MEDICINE

## 2023-06-21 NOTE — PROGRESS NOTES
Subjective:   Patient ID:  Tariq Gutierrez is a 82 y.o. male who presents for follow-up of No chief complaint on file.  Patient is a 81 y.o. male with a past medical history of DM, hld, htn that presented to the hospital for further evaluation after obtaining a CT at an outlying facility with a large atherosclerotic ulcer with possible penetrating appearance, as well as large hiatal hernia, possible infectious process in the chest. Patient notes pain in LUQ radiating to the shoulder. Upon presentation to the ED stat CTA chest abdomen pelvis performed. Patient currently resting in the bed stable on room air in NAD. Vital signs reviewed and are stable.     05/19/2023:   Overall patient is doing well some medications for hypertension, hyperlipidemia and diabetes.  Overall stable no exertional symptoms chest pain shortness breath EKG shows normal sinus rhythm within normal limits.  Patient has moderate carotid disease will recheck that with carotid ultrasound.  Cardiac echo be done assess LV function.  Patient has with thought was thought to be a penetrating ulcer of the thoracic aorta will repeat CTA.  No exertional symptoms no pain today and will follow-up all testing.  Lipid profile be done to reestablish the need for more additional Lipitor he is only on 10 mg I will follow-up with the daughter in the near future.  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  Carotid ultrasound 05/19/2023:   There is 0-19% right Internal Carotid Stenosis.  There is 40-49% left Internal Carotid Stenosis.   Cardiac echo 05/19/2023:   The left ventricle is normal in size with concentric remodeling and normal systolic function.  The estimated ejection fraction is 55%.  Indeterminate left ventricular diastolic function.  Normal right ventricular size with normal right ventricular systolic function.  Mild aortic regurgitation.  Moderate tricuspid regurgitation.  Mild pulmonic  regurgitation.  Intermediate central venous pressure (8 mmHg).  The estimated PA systolic pressure is 51 mmHg.  There is pulmonary hypertension.   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  The patient will follow up to Dr. Washington for evaluation for stent grafting in the future.  This will happen in the near future and will see Dr. Washington in the near future.    Otherwise stable patient is otherwise having no chest discomfort acute shortness breath.  Patient has a ventral hernia he wants repaired and he will have to wait until this aortic situation is treated.  Daughter was present today understands these issues.      Review of Systems   Constitutional: Negative for chills, diaphoresis, night sweats, weight gain and weight loss.   HENT:  Negative for congestion, hoarse voice, sore throat and stridor.    Eyes:  Negative for double vision and pain.   Cardiovascular:  Negative for chest pain, claudication, cyanosis, dyspnea on exertion, irregular heartbeat, leg swelling, near-syncope, orthopnea, palpitations, paroxysmal nocturnal dyspnea and syncope.   Respiratory:  Negative for cough, hemoptysis, shortness of breath, sleep disturbances due to breathing, snoring, sputum production and wheezing.    Endocrine: Negative for cold intolerance, heat intolerance and polydipsia.   Hematologic/Lymphatic: Negative for bleeding problem. Does not bruise/bleed easily.   Skin:  Negative for color change, dry skin and rash.   Musculoskeletal:  Negative for joint swelling and muscle cramps.   Gastrointestinal:  Negative for bloating, abdominal pain, constipation, diarrhea, dysphagia, melena, nausea and vomiting.    Genitourinary:  Negative for flank pain and urgency.   Neurological:  Negative for dizziness, focal weakness, headaches, light-headedness, loss of balance, seizures and weakness.   Psychiatric/Behavioral:  Negative for altered mental status and memory loss. The patient is not nervous/anxious.    No family history on file.  Past Medical History:   Diagnosis Date    Diabetes mellitus     Hypertension      Social History     Socioeconomic History    Marital status: Single   Tobacco Use    Smoking status: Former     Types: Cigarettes    Smokeless tobacco: Never   Substance and Sexual Activity    Alcohol use: Never    Drug use: Never    Sexual activity: Never     Current Outpatient Medications on File Prior to Visit   Medication Sig Dispense Refill    aspirin (ECOTRIN) 81 MG EC tablet Take 81 mg by mouth once daily.      atorvastatin (LIPITOR) 10 MG tablet       clopidogreL (PLAVIX) 75 mg tablet       lisinopriL-hydrochlorothiazide (PRINZIDE,ZESTORETIC) 20-12.5 mg per tablet       metFORMIN (GLUCOPHAGE) 500 MG tablet       metoprolol tartrate (LOPRESSOR) 100 MG tablet        No current facility-administered medications on file prior to visit.     Review of patient's allergies indicates:  No Known Allergies    Objective:     Physical Exam  Eyes:      Pupils: Pupils are equal, round, and reactive to light.   Neck:      Trachea: No tracheal deviation.   Cardiovascular:      Rate and Rhythm: Normal rate and regular rhythm.      Pulses: Intact distal pulses.           Carotid pulses are 2+ on the right side and 2+ on the left side.       Radial pulses are 2+ on the right side and 2+ on the left side.        Femoral pulses are 2+ on the right side and 2+ on the left side.       Popliteal pulses are 2+ on the right side and 2+ on the left side.        Dorsalis pedis pulses are 2+ on the right side and 2+ on the left side.        Posterior tibial pulses are 2+ on the right side and 2+ on the left side.      Heart sounds:  Normal heart sounds. No murmur heard.    No friction rub. No gallop.   Pulmonary:      Effort: Pulmonary effort is normal. No respiratory distress.      Breath sounds: Normal breath sounds. No stridor. No wheezing or rales.   Chest:      Chest wall: No tenderness.   Abdominal:      General: There is no distension.      Tenderness: There is no abdominal tenderness. There is no rebound.   Musculoskeletal:         General: No tenderness.      Cervical back: Normal range of motion.   Skin:     General: Skin is warm and dry.   Neurological:      Mental Status: He is alert and oriented to person, place, and time.     Assessment:     1. Bilateral carotid artery stenosis    2. Typical atrial flutter    3. Penetrating atherosclerotic ulcer of aorta    4. 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 Penetrating athero sclerosis of the aorta stable see Dr. Washington for potential stent placement.  No chest discomfort denies exertional symptoms follow-up evaluation again in several months sooner if acute recurrence symptoms.  All medications reviewed renewed as necessary.  The patient continues on beta-blocker and lisinopril.    3. Hyperlipidemia stable statin medications   All questions answered today.  For severe chest discomfort the patient will go to the emergency room.

## 2023-06-28 ENCOUNTER — INITIAL CONSULT (OUTPATIENT)
Dept: VASCULAR SURGERY | Facility: CLINIC | Age: 83
End: 2023-06-28
Payer: MEDICARE

## 2023-06-28 VITALS
HEART RATE: 70 BPM | SYSTOLIC BLOOD PRESSURE: 120 MMHG | DIASTOLIC BLOOD PRESSURE: 62 MMHG | WEIGHT: 131.19 LBS | BODY MASS INDEX: 24.79 KG/M2

## 2023-06-28 DIAGNOSIS — I65.23 BILATERAL CAROTID ARTERY STENOSIS: ICD-10-CM

## 2023-06-28 DIAGNOSIS — I71.9 PENETRATING ATHEROSCLEROTIC ULCER OF AORTA: Primary | ICD-10-CM

## 2023-06-28 PROCEDURE — 99999 PR PBB SHADOW E&M-EST. PATIENT-LVL III: ICD-10-PCS | Mod: PBBFAC,,, | Performed by: SURGERY

## 2023-06-28 PROCEDURE — 99999 PR PBB SHADOW E&M-EST. PATIENT-LVL III: CPT | Mod: PBBFAC,,, | Performed by: SURGERY

## 2023-06-28 PROCEDURE — 99205 PR OFFICE/OUTPT VISIT, NEW, LEVL V, 60-74 MIN: ICD-10-PCS | Mod: S$GLB,,, | Performed by: SURGERY

## 2023-06-28 PROCEDURE — 99205 OFFICE O/P NEW HI 60 MIN: CPT | Mod: S$GLB,,, | Performed by: SURGERY

## 2023-06-28 NOTE — PROGRESS NOTES
The [facility] Vascular Surgery  Congenital Cardiovascular Surgery  Consult Note    Patient Name: Tariq Gutierrez  MRN: [id number]  Admission Date: (Not on file)  Hospital Length of Stay: 0 days   Attending Physician: No att. providers found  Primary Care Provider: Primary Doctor No    Patient information was obtained from patient and relative(s).     Consults  Subjective:     Chief Complaint thoracic aortic ulceration    History of Present Illness: 82 y.o. male with a past medical history of DM, hld, htn that presented to the hospital for further evaluation after obtaining a CT at an outlying facility with a large atherosclerotic ulcer with possible penetrating appearance, as well as large hiatal hernia, possible infectious process in the chest. Patient notes pain in LUQ radiating to the shoulder    6/28/23  Patient presents today for evaluation of a descending thoracic aortic penetrating ulceration.  CT angiogram confirms present and aneurysmal dilation.  Continues with intermittent episodes of chest pain.    Current Outpatient Medications   Medication    aspirin (ECOTRIN) 81 MG EC tablet    atorvastatin (LIPITOR) 10 MG tablet    clopidogreL (PLAVIX) 75 mg tablet    lisinopriL-hydrochlorothiazide (PRINZIDE,ZESTORETIC) 20-12.5 mg per tablet    metFORMIN (GLUCOPHAGE) 500 MG tablet    metoprolol tartrate (LOPRESSOR) 100 MG tablet     No current facility-administered medications for this visit.       Review of patient's allergies indicates:  No Known Allergies    Past Medical History:   Diagnosis Date    Diabetes mellitus     Hypertension      No past surgical history on file.  Family History    None       Tobacco Use    Smoking status: Former     Types: Cigarettes    Smokeless tobacco: Never   Substance and Sexual Activity    Alcohol use: Never    Drug use: Never    Sexual activity: Never     Review of Systems   Constitutional:  Negative for activity change, appetite change, fatigue and fever.   HENT:  Negative for  congestion.    Eyes:  Negative for photophobia, redness and visual disturbance.   Respiratory:  Positive for chest tightness. Negative for apnea, cough and shortness of breath.    Cardiovascular:  Negative for chest pain and leg swelling.   Gastrointestinal:  Negative for abdominal pain, nausea and vomiting.   Genitourinary:  Negative for difficulty urinating.   Musculoskeletal:  Positive for back pain. Negative for gait problem and myalgias.   Skin:  Negative for color change, rash and wound.   Neurological:  Negative for syncope, facial asymmetry, speech difficulty, weakness and numbness.   Objective:     Vital Signs (Most Recent):  Pulse: 70 (06/28/23 1036)  BP: 120/62 (06/28/23 1036) Vital Signs (24h Range):  [unfilled]     Weight: 59.5 kg (131 lb 2.8 oz)  Body mass index is 24.79 kg/m².            [unfilled]    Physical Exam  Vitals and nursing note reviewed.   Constitutional:       Appearance: Normal appearance. He is normal weight.   HENT:      Head: Normocephalic and atraumatic.      Mouth/Throat:      Mouth: Mucous membranes are moist.   Eyes:      Extraocular Movements: Extraocular movements intact.      Conjunctiva/sclera: Conjunctivae normal.      Pupils: Pupils are equal, round, and reactive to light.   Neck:      Vascular: No carotid bruit.   Cardiovascular:      Rate and Rhythm: Normal rate and regular rhythm.      Pulses: Normal pulses.   Pulmonary:      Effort: Pulmonary effort is normal.      Breath sounds: Normal breath sounds.   Abdominal:      General: Abdomen is flat. Bowel sounds are normal.      Palpations: Abdomen is soft.   Musculoskeletal:      Cervical back: Neck supple.   Skin:     General: Skin is warm.      Capillary Refill: Capillary refill takes 2 to 3 seconds.   Neurological:      General: No focal deficit present.      Mental Status: He is alert and oriented to person, place, and time. Mental status is at baseline.      Cranial Nerves: No cranial nerve deficit.      Sensory: No  sensory deficit.      Motor: No weakness.   Psychiatric:         Mood and Affect: Mood normal.         Behavior: Behavior normal.       Significant Labs:  I have reviewed all pertinent lab results within the past 24 hours.    Significant Diagnostics:  I have reviewed all pertinent imaging results/findings within the past 24 hours.  CT: I have reviewed all pertinent results/findings within the past 24 hours and my personal findings are:  Penetrating aortic ulceration and descending thoracic aorta    Assessment/Plan:     There are no hospital problems to display for this patient.      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  We will require thoracic aortic endograft placement with laser fenestration of the left subclavian artery  Unfortunately the [facility] does not have the hybrid room needed for this surgical intervention.  Surgery will be performed at the [facility]    Thank you for your consult. I will follow-up with patient. Please contact us if you have any additional questions.    Talha Washington IV, MD  Vascular Surgery  [facility] Vascular Surgery

## 2023-06-30 ENCOUNTER — TELEPHONE (OUTPATIENT)
Dept: CARDIOLOGY | Facility: CLINIC | Age: 83
End: 2023-06-30
Payer: MEDICARE

## 2023-06-30 NOTE — TELEPHONE ENCOUNTER
Pt stated that he has already talked to the  And stated that at his next appt he will have his procedure done. I told him that someone will follow up with him about getting consents signed.    ----- Message from Kevin Phillips MD sent at 6/30/2023  4:13 PM CDT -----  Pt needs to follow up in clinic with Dr. Bland and will discuss further. Thanks      ----- Message -----  From: Talha Washington IV, MD  Sent: 6/28/2023  11:04 AM CDT  To: Chris Bland MD    Cards clearance for TEVAR with subclavian artery stenting    thanks

## 2023-06-30 NOTE — TELEPHONE ENCOUNTER
Yes from Dr. Washington's note pt has CP and he may need cath or nuc stress test based on symptoms for cardiac clearance, if he can see an GALEN/MD this week that would be good to get things done asap. Thanks     Appt 7/7        Scheduled 7/17 but saw you 6/21- is visit needed?  Please advise        ----- Message from Kevin Phillips MD sent at 6/30/2023  4:18 PM CDT -----  Pt needs to follow up in clinic with Dr. Bland and will discuss further. Thanks    ----- Message -----  From: Aislinn Valle RN  Sent: 6/29/2023  12:04 PM CDT  To: Chris Bland MD    Please advise for cardiac clearance for Dr washington - pt seen 6/22/2023  (I messaged T[facility] staff to get pt set up)  Thanks  ----- Message -----  From: Cat Lopez MA  Sent: 6/28/2023  11:08 AM CDT  To: Edwina BISHOP Staff    Patient was seen by Dr. Washington today. He needs Cardiac clearance and setup for TEVAR. Please advise

## 2023-07-06 ENCOUNTER — TELEPHONE (OUTPATIENT)
Dept: CARDIOLOGY | Facility: CLINIC | Age: 83
End: 2023-07-06
Payer: MEDICARE

## 2023-07-06 NOTE — TELEPHONE ENCOUNTER
Patient contacted and was advised that we will see the patient in the clinic. The visit will serve as a pre-op appointment.      ----- Message from Manny Lawton sent at 7/6/2023  8:24 AM CDT -----  Contact: Joshua/[facility]  Joshua is needing a call back in regards to checking the status of the patients clearance paperwork. Please give her a call back at [phone]

## 2023-07-07 ENCOUNTER — OFFICE VISIT (OUTPATIENT)
Dept: CARDIOLOGY | Facility: CLINIC | Age: 83
End: 2023-07-07
Payer: MEDICARE

## 2023-07-07 VITALS
HEART RATE: 71 BPM | DIASTOLIC BLOOD PRESSURE: 88 MMHG | BODY MASS INDEX: 25.1 KG/M2 | WEIGHT: 132.94 LBS | SYSTOLIC BLOOD PRESSURE: 138 MMHG | HEIGHT: 61 IN | OXYGEN SATURATION: 97 %

## 2023-07-07 DIAGNOSIS — I71.9 PENETRATING ATHEROSCLEROTIC ULCER OF AORTA: ICD-10-CM

## 2023-07-07 DIAGNOSIS — I65.23 BILATERAL CAROTID ARTERY STENOSIS: ICD-10-CM

## 2023-07-07 DIAGNOSIS — I25.110 ATHEROSCLEROSIS OF NATIVE CORONARY ARTERY OF NATIVE HEART WITH UNSTABLE ANGINA PECTORIS: Primary | ICD-10-CM

## 2023-07-07 DIAGNOSIS — R06.09 DOE (DYSPNEA ON EXERTION): ICD-10-CM

## 2023-07-07 DIAGNOSIS — I48.3 TYPICAL ATRIAL FLUTTER: ICD-10-CM

## 2023-07-07 DIAGNOSIS — Z01.811 PRE-OP CHEST EXAM: ICD-10-CM

## 2023-07-07 PROCEDURE — 1126F AMNT PAIN NOTED NONE PRSNT: CPT | Mod: CPTII,S$GLB,, | Performed by: INTERNAL MEDICINE

## 2023-07-07 PROCEDURE — 3075F PR MOST RECENT SYSTOLIC BLOOD PRESS GE 130-139MM HG: ICD-10-PCS | Mod: CPTII,S$GLB,, | Performed by: INTERNAL MEDICINE

## 2023-07-07 PROCEDURE — 3079F DIAST BP 80-89 MM HG: CPT | Mod: CPTII,S$GLB,, | Performed by: INTERNAL MEDICINE

## 2023-07-07 PROCEDURE — 3288F PR FALLS RISK ASSESSMENT DOCUMENTED: ICD-10-PCS | Mod: CPTII,S$GLB,, | Performed by: INTERNAL MEDICINE

## 2023-07-07 PROCEDURE — 1159F PR MEDICATION LIST DOCUMENTED IN MEDICAL RECORD: ICD-10-PCS | Mod: CPTII,S$GLB,, | Performed by: INTERNAL MEDICINE

## 2023-07-07 PROCEDURE — 1100F PTFALLS ASSESS-DOCD GE2>/YR: CPT | Mod: CPTII,S$GLB,, | Performed by: INTERNAL MEDICINE

## 2023-07-07 PROCEDURE — 3079F PR MOST RECENT DIASTOLIC BLOOD PRESSURE 80-89 MM HG: ICD-10-PCS | Mod: CPTII,S$GLB,, | Performed by: INTERNAL MEDICINE

## 2023-07-07 PROCEDURE — 3288F FALL RISK ASSESSMENT DOCD: CPT | Mod: CPTII,S$GLB,, | Performed by: INTERNAL MEDICINE

## 2023-07-07 PROCEDURE — 1160F PR REVIEW ALL MEDS BY PRESCRIBER/CLIN PHARMACIST DOCUMENTED: ICD-10-PCS | Mod: CPTII,S$GLB,, | Performed by: INTERNAL MEDICINE

## 2023-07-07 PROCEDURE — 99999 PR PBB SHADOW E&M-EST. PATIENT-LVL IV: CPT | Mod: PBBFAC,,, | Performed by: INTERNAL MEDICINE

## 2023-07-07 PROCEDURE — 1159F MED LIST DOCD IN RCRD: CPT | Mod: CPTII,S$GLB,, | Performed by: INTERNAL MEDICINE

## 2023-07-07 PROCEDURE — 99214 PR OFFICE/OUTPT VISIT, EST, LEVL IV, 30-39 MIN: ICD-10-PCS | Mod: S$GLB,,, | Performed by: INTERNAL MEDICINE

## 2023-07-07 PROCEDURE — 99214 OFFICE O/P EST MOD 30 MIN: CPT | Mod: S$GLB,,, | Performed by: INTERNAL MEDICINE

## 2023-07-07 PROCEDURE — 3075F SYST BP GE 130 - 139MM HG: CPT | Mod: CPTII,S$GLB,, | Performed by: INTERNAL MEDICINE

## 2023-07-07 PROCEDURE — 99999 PR PBB SHADOW E&M-EST. PATIENT-LVL IV: ICD-10-PCS | Mod: PBBFAC,,, | Performed by: INTERNAL MEDICINE

## 2023-07-07 PROCEDURE — 1100F PR PT FALLS ASSESS DOC 2+ FALLS/FALL W/INJURY/YR: ICD-10-PCS | Mod: CPTII,S$GLB,, | Performed by: INTERNAL MEDICINE

## 2023-07-07 PROCEDURE — 1160F RVW MEDS BY RX/DR IN RCRD: CPT | Mod: CPTII,S$GLB,, | Performed by: INTERNAL MEDICINE

## 2023-07-07 PROCEDURE — 1126F PR PAIN SEVERITY QUANTIFIED, NO PAIN PRESENT: ICD-10-PCS | Mod: CPTII,S$GLB,, | Performed by: INTERNAL MEDICINE

## 2023-07-07 RX ORDER — LINAGLIPTIN 5 MG/1
5 TABLET, FILM COATED ORAL
COMMUNITY
Start: 2023-06-27

## 2023-07-07 RX ORDER — ALBUTEROL SULFATE 90 UG/1
AEROSOL, METERED RESPIRATORY (INHALATION)
COMMUNITY
Start: 2023-06-05

## 2023-07-07 NOTE — PROGRESS NOTES
Subjective:   Patient ID:  Tariq Gutierrez is a 82 y.o. male who presents for follow-up of No chief complaint on file.  Patient is a 81 y.o. male with a past medical history of DM, hld, htn that presented to the hospital for further evaluation after obtaining a CT at an outlying facility with a large atherosclerotic ulcer with possible penetrating appearance, as well as large hiatal hernia, possible infectious process in the chest. Patient notes pain in LUQ radiating to the shoulder. Upon presentation to the ED stat CTA chest abdomen pelvis performed. Patient currently resting in the bed stable on room air in NAD. Vital signs reviewed and are stable.     05/19/2023:   Overall patient is doing well some medications for hypertension, hyperlipidemia and diabetes.  Overall stable no exertional symptoms chest pain shortness breath EKG shows normal sinus rhythm within normal limits.  Patient has moderate carotid disease will recheck that with carotid ultrasound.  Cardiac echo be done assess LV function.  Patient has with thought was thought to be a penetrating ulcer of the thoracic aorta will repeat CTA.  No exertional symptoms no pain today and will follow-up all testing.  Lipid profile be done to reestablish the need for more additional Lipitor he is only on 10 mg I will follow-up with the daughter in the near future.  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  Carotid ultrasound 05/19/2023:   There is 0-19% right Internal Carotid Stenosis.  There is 40-49% left Internal Carotid Stenosis.   Cardiac echo 05/19/2023:   The left ventricle is normal in size with concentric remodeling and normal systolic function.  The estimated ejection fraction is 55%.  Indeterminate left ventricular diastolic function.  Normal right ventricular size with normal right ventricular systolic function.  Mild aortic regurgitation.  Moderate tricuspid regurgitation.  Mild pulmonic  regurgitation.  Intermediate central venous pressure (8 mmHg).  The estimated PA systolic pressure is 51 mmHg.  There is pulmonary hypertension.   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  The patient will follow up to Dr. Washington for evaluation for stent grafting in the future.  This will happen in the near future and will see Dr. Washington in the near future.    Otherwise stable patient is otherwise having no chest discomfort acute shortness breath.  Patient has a ventral hernia he wants repaired and he will have to wait until this aortic situation is treated.  Daughter was present today understands these issues.       07/07/2023:   Overall patient is stable however he is significant vascular risk significant narrowing of his aorta and peripheral vascular disease.  History of prior stroke moderate carotid disease.  In preparation for vascular procedure will go ahead with a nuclear stress test rule out cardiac ischemia.      Review of Systems   Constitutional: Negative for chills, diaphoresis, night sweats, weight gain and weight loss.   HENT:  Negative for congestion, hoarse voice, sore throat and stridor.    Eyes:  Negative for double vision and pain.   Cardiovascular:  Negative for chest pain, claudication, cyanosis, dyspnea on exertion, irregular heartbeat, leg swelling, near-syncope, orthopnea, palpitations, paroxysmal nocturnal dyspnea and syncope.   Respiratory:  Negative for cough, hemoptysis, shortness of breath, sleep disturbances due to breathing, snoring, sputum production and wheezing.    Endocrine: Negative for cold intolerance, heat intolerance and polydipsia.   Hematologic/Lymphatic:  Negative for bleeding problem. Does not bruise/bleed easily.   Skin:  Negative for color change, dry skin and rash.   Musculoskeletal:  Negative for joint swelling and muscle cramps.   Gastrointestinal:  Negative for bloating, abdominal pain, constipation, diarrhea, dysphagia, melena, nausea and vomiting.   Genitourinary:  Negative for flank pain and urgency.   Neurological:  Negative for dizziness, focal weakness, headaches, light-headedness, loss of balance, seizures and weakness.   Psychiatric/Behavioral:  Negative for altered mental status and memory loss. The patient is not nervous/anxious.    No family history on file.  Past Medical History:   Diagnosis Date    Diabetes mellitus     Hypertension      Social History     Socioeconomic History    Marital status: Single   Tobacco Use    Smoking status: Former     Types: Cigarettes    Smokeless tobacco: Never   Substance and Sexual Activity    Alcohol use: Never    Drug use: Never    Sexual activity: Never     Current Outpatient Medications on File Prior to Visit   Medication Sig Dispense Refill    aspirin (ECOTRIN) 81 MG EC tablet Take 81 mg by mouth once daily.      atorvastatin (LIPITOR) 10 MG tablet       clopidogreL (PLAVIX) 75 mg tablet       lisinopriL-hydrochlorothiazide (PRINZIDE,ZESTORETIC) 20-12.5 mg per tablet       metFORMIN (GLUCOPHAGE) 500 MG tablet       metoprolol tartrate (LOPRESSOR) 100 MG tablet        No current facility-administered medications on file prior to visit.     Review of patient's allergies indicates:  No Known Allergies    Objective:     Physical Exam  Eyes:      Pupils: Pupils are equal, round, and reactive to light.   Neck:      Trachea: No tracheal deviation.   Cardiovascular:      Rate and Rhythm: Normal rate and regular rhythm.      Pulses: Intact distal pulses.           Carotid pulses are 2+ on the right side and 2+ on the left side.       Radial pulses are 2+ on the right side and 2+ on the left side.        Femoral pulses  are 2+ on the right side and 2+ on the left side.       Popliteal pulses are 2+ on the right side and 2+ on the left side.        Dorsalis pedis pulses are 2+ on the right side and 2+ on the left side.        Posterior tibial pulses are 2+ on the right side and 2+ on the left side.      Heart sounds: Normal heart sounds. No murmur heard.    No friction rub. No gallop.   Pulmonary:      Effort: Pulmonary effort is normal. No respiratory distress.      Breath sounds: Normal breath sounds. No stridor. No wheezing or rales.   Chest:      Chest wall: No tenderness.   Abdominal:      General: There is no distension.      Tenderness: There is no abdominal tenderness. There is no rebound.   Musculoskeletal:         General: No tenderness.      Cervical back: Normal range of motion.   Skin:     General: Skin is warm and dry.   Neurological:      Mental Status: He is alert and oriented to person, place, and time.     Assessment:     1. Bilateral carotid artery stenosis    2. BURTON (dyspnea on exertion)    3. Typical atrial flutter    4. Penetrating atherosclerotic ulcer of aorta    5. 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  2. Dyspnea exertion will re-evaluate with a nuclear stress test.    3 Preop evaluation will finalize after nuclear stress test.

## 2023-07-10 ENCOUNTER — HOSPITAL ENCOUNTER (OUTPATIENT)
Dept: RADIOLOGY | Facility: HOSPITAL | Age: 83
Discharge: HOME | End: 2023-07-10
Attending: INTERNAL MEDICINE
Payer: MEDICARE

## 2023-07-10 ENCOUNTER — HOSPITAL ENCOUNTER (OUTPATIENT)
Dept: CARDIOLOGY | Facility: HOSPITAL | Age: 83
Discharge: HOME | End: 2023-07-10
Attending: INTERNAL MEDICINE
Payer: MEDICARE

## 2023-07-10 DIAGNOSIS — R06.09 DOE (DYSPNEA ON EXERTION): ICD-10-CM

## 2023-07-10 DIAGNOSIS — I65.23 BILATERAL CAROTID ARTERY STENOSIS: ICD-10-CM

## 2023-07-10 DIAGNOSIS — I25.110 ATHEROSCLEROSIS OF NATIVE CORONARY ARTERY OF NATIVE HEART WITH UNSTABLE ANGINA PECTORIS: ICD-10-CM

## 2023-07-10 DIAGNOSIS — I48.3 TYPICAL ATRIAL FLUTTER: ICD-10-CM

## 2023-07-10 DIAGNOSIS — I71.9 PENETRATING ATHEROSCLEROTIC ULCER OF AORTA: ICD-10-CM

## 2023-07-10 LAB
CV STRESS BASE HR: 63 BPM
DIASTOLIC BLOOD PRESSURE: 74 MMHG
NUC REST EJECTION FRACTION: 72
NUC STRESS EJECTION FRACTION: 80 %
OHS CV CPX 85 PERCENT MAX PREDICTED HEART RATE MALE: 118
OHS CV CPX ESTIMATED METS: 1
OHS CV CPX MAX PREDICTED HEART RATE: 139
OHS CV CPX PATIENT IS FEMALE: 0
OHS CV CPX PATIENT IS MALE: 1
OHS CV CPX PEAK DIASTOLIC BLOOD PRESSURE: 50 MMHG
OHS CV CPX PEAK HEAR RATE: 96 BPM
OHS CV CPX PEAK RATE PRESSURE PRODUCT: NORMAL
OHS CV CPX PEAK SYSTOLIC BLOOD PRESSURE: 135 MMHG
OHS CV CPX PERCENT MAX PREDICTED HEART RATE ACHIEVED: 69
OHS CV CPX RATE PRESSURE PRODUCT PRESENTING: NORMAL
STRESS ECHO POST EXERCISE DUR MIN: 0 MINUTES
STRESS ECHO POST EXERCISE DUR SEC: 52 SECONDS
SYSTOLIC BLOOD PRESSURE: 190 MMHG

## 2023-07-10 PROCEDURE — 93016 NUCLEAR STRESS - CARDIOLOGY INTERPRETED (CUPID ONLY): ICD-10-PCS | Mod: ,,, | Performed by: INTERNAL MEDICINE

## 2023-07-10 PROCEDURE — 78452 NUCLEAR STRESS - CARDIOLOGY INTERPRETED (CUPID ONLY): ICD-10-PCS | Mod: 26,,, | Performed by: INTERNAL MEDICINE

## 2023-07-10 PROCEDURE — 93017 CV STRESS TEST TRACING ONLY: CPT

## 2023-07-10 PROCEDURE — 63600175 PHARM REV CODE 636 W HCPCS: Performed by: INTERNAL MEDICINE

## 2023-07-10 PROCEDURE — 93018 CV STRESS TEST I&R ONLY: CPT | Mod: ,,, | Performed by: INTERNAL MEDICINE

## 2023-07-10 PROCEDURE — 78452 HT MUSCLE IMAGE SPECT MULT: CPT | Mod: 26,,, | Performed by: INTERNAL MEDICINE

## 2023-07-10 PROCEDURE — 93016 CV STRESS TEST SUPVJ ONLY: CPT | Mod: ,,, | Performed by: INTERNAL MEDICINE

## 2023-07-10 PROCEDURE — 78452 HT MUSCLE IMAGE SPECT MULT: CPT

## 2023-07-10 PROCEDURE — 93018 NUCLEAR STRESS - CARDIOLOGY INTERPRETED (CUPID ONLY): ICD-10-PCS | Mod: ,,, | Performed by: INTERNAL MEDICINE

## 2023-07-10 RX ORDER — REGADENOSON 0.08 MG/ML
0.4 INJECTION, SOLUTION INTRAVENOUS ONCE
Status: COMPLETED | OUTPATIENT
Start: 2023-07-10 | End: 2023-07-10

## 2023-07-10 RX ADMIN — REGADENOSON 0.4 MG: 0.08 INJECTION, SOLUTION INTRAVENOUS at 12:07

## 2023-07-11 ENCOUNTER — PATIENT MESSAGE (OUTPATIENT)
Dept: VASCULAR SURGERY | Facility: CLINIC | Age: 83
End: 2023-07-11
Payer: MEDICARE

## 2023-07-11 ENCOUNTER — TELEPHONE (OUTPATIENT)
Dept: CARDIOLOGY | Facility: CLINIC | Age: 83
End: 2023-07-11
Payer: MEDICARE

## 2023-07-11 ENCOUNTER — PATIENT MESSAGE (OUTPATIENT)
Dept: CARDIOLOGY | Facility: CLINIC | Age: 83
End: 2023-07-11
Payer: MEDICARE

## 2023-07-11 NOTE — TELEPHONE ENCOUNTER
Contacted patient; Patient received and understood nuclear stress results with no questions or concerns.       ----- Message from hCris Bland MD sent at 7/10/2023  6:27 PM CDT -----  Normal nuclear study, no blockages  ----- Message -----  From: River Villarreal MD  Sent: 7/10/2023   4:55 PM CDT  To: Chris Bland MD

## 2023-07-17 ENCOUNTER — TELEPHONE (OUTPATIENT)
Dept: CARDIOLOGY | Facility: CLINIC | Age: 83
End: 2023-07-17
Payer: MEDICARE

## 2023-07-17 NOTE — TELEPHONE ENCOUNTER
Spoke to Joshua and let her know that I need the fax the paperwork for the doctor to sign to clear him.    ----- Message from Manny Lawton sent at 7/17/2023 11:24 AM CDT -----  Contact: [facility]  Joshua is needing a call back in regards to checking the status of the patient clearance paperwork. Please give her a call back at [phone]

## 2023-07-24 ENCOUNTER — PATIENT MESSAGE (OUTPATIENT)
Dept: CARDIOLOGY | Facility: CLINIC | Age: 83
End: 2023-07-24
Payer: MEDICARE

## 2023-07-24 ENCOUNTER — PATIENT MESSAGE (OUTPATIENT)
Dept: VASCULAR SURGERY | Facility: CLINIC | Age: 83
End: 2023-07-24
Payer: MEDICARE

## 2023-07-27 ENCOUNTER — HOSPITAL ENCOUNTER (EMERGENCY)
Facility: HOSPITAL | Age: 83
Discharge: HOME | End: 2023-07-27
Attending: FAMILY MEDICINE
Payer: MEDICARE

## 2023-07-27 ENCOUNTER — PATIENT MESSAGE (OUTPATIENT)
Dept: VASCULAR SURGERY | Facility: CLINIC | Age: 83
End: 2023-07-27
Payer: MEDICARE

## 2023-07-27 VITALS
BODY MASS INDEX: 24.84 KG/M2 | RESPIRATION RATE: 20 BRPM | HEART RATE: 70 BPM | OXYGEN SATURATION: 99 % | HEIGHT: 62 IN | SYSTOLIC BLOOD PRESSURE: 150 MMHG | WEIGHT: 135 LBS | DIASTOLIC BLOOD PRESSURE: 75 MMHG | TEMPERATURE: 98 F

## 2023-07-27 DIAGNOSIS — L72.3 INFECTED SEBACEOUS CYST: Primary | ICD-10-CM

## 2023-07-27 DIAGNOSIS — L08.9 INFECTED SEBACEOUS CYST: Primary | ICD-10-CM

## 2023-07-27 PROCEDURE — 10060 I&D ABSCESS SIMPLE/SINGLE: CPT

## 2023-07-27 PROCEDURE — 99284 EMERGENCY DEPT VISIT MOD MDM: CPT | Mod: 25

## 2023-07-27 PROCEDURE — 25000003 PHARM REV CODE 250: Performed by: FAMILY MEDICINE

## 2023-07-27 RX ORDER — TRAMADOL HYDROCHLORIDE 50 MG/1
50 TABLET ORAL NIGHTLY
Qty: 12 TABLET | Refills: 0 | Status: SHIPPED | OUTPATIENT
Start: 2023-07-27 | End: 2023-08-08

## 2023-07-27 RX ORDER — LIDOCAINE HYDROCHLORIDE 20 MG/ML
10 INJECTION, SOLUTION INFILTRATION; PERINEURAL
Status: COMPLETED | OUTPATIENT
Start: 2023-07-27 | End: 2023-07-27

## 2023-07-27 RX ORDER — TRAMADOL HYDROCHLORIDE 50 MG/1
50 TABLET ORAL
Status: COMPLETED | OUTPATIENT
Start: 2023-07-27 | End: 2023-07-27

## 2023-07-27 RX ORDER — SULFAMETHOXAZOLE AND TRIMETHOPRIM 800; 160 MG/1; MG/1
1 TABLET ORAL 2 TIMES DAILY
Qty: 20 TABLET | Refills: 0 | Status: SHIPPED | OUTPATIENT
Start: 2023-07-27 | End: 2023-08-06

## 2023-07-27 RX ADMIN — LIDOCAINE HYDROCHLORIDE 10 ML: 20 INJECTION, SOLUTION INFILTRATION; PERINEURAL at 01:07

## 2023-07-27 RX ADMIN — TRAMADOL HYDROCHLORIDE 50 MG: 50 TABLET, COATED ORAL at 02:07

## 2023-07-27 NOTE — ED PROVIDER NOTES
Encounter Date: 7/27/2023       History     Chief Complaint   Patient presents with    Abscess     Right shoulder abscess x 1 month     This patient is an 82-year-old male who comes in with an abscess on the right upper back.  Patient states that he is had an abscess before in this area approximately 3 years ago and he had to have it drained, and he had to go back several times and each time they would keep getting infection out of the abscess.  Patient states it started hurting him again approximately 1 month ago but got very bad over the past couple days.    The history is provided by the patient.   Review of patient's allergies indicates:  No Known Allergies  Past Medical History:   Diagnosis Date    Diabetes mellitus     Hypertension      History reviewed. No pertinent surgical history.  History reviewed. No pertinent family history.  Social History     Tobacco Use    Smoking status: Former     Types: Cigarettes    Smokeless tobacco: Never   Substance Use Topics    Alcohol use: Never    Drug use: Never     Review of Systems   Constitutional: Negative.    HENT: Negative.     Respiratory: Negative.     Cardiovascular: Negative.    Endocrine: Negative.    Skin:         Abscess on the right upper back   Neurological: Negative.    Psychiatric/Behavioral: Negative.     All other systems reviewed and are negative.    Physical Exam     Initial Vitals [07/27/23 1316]   BP Pulse Resp Temp SpO2   (!) 176/78 69 20 97.9 °F (36.6 °C) 96 %      MAP       --         Physical Exam    Nursing note and vitals reviewed.  Constitutional: He appears well-developed and well-nourished.   HENT:   Head: Normocephalic.   Eyes: Pupils are equal, round, and reactive to light.   Neck:   Normal range of motion.  Cardiovascular:  Normal rate and regular rhythm.           Pulmonary/Chest: Breath sounds normal.   Abdominal: Abdomen is soft. Bowel sounds are normal.   Musculoskeletal:         General: Normal range of motion.      Cervical back:  Normal range of motion.     Neurological: He is alert and oriented to person, place, and time.   Skin: Skin is warm and dry.        There is a sebaceous cyst on the right upper back that appears to be infected.  It is approximately 5 cm in diameter, erythematous and not draining.  Very painful to touch.   Psychiatric: He has a normal mood and affect.       ED Course   I & D - Incision and Drainage    Date/Time: 7/27/2023 2:58 PM  Location procedure was performed: [facility] EMERGENCY DEPARTMENT  Performed by: Tung Sandoval MD  Authorized by: Tung Sandoval MD   Assisting provider: Tung Sandoval MD  Pre-operative diagnosis: Infected sebaceous cyst  Post-operative diagnosis: Sebaceous cyst  Consent Done: Emergent Situation  Type: abscess  Body area: trunk  Location details: back  Anesthesia: local infiltration    Anesthesia:  Local Anesthetic: lidocaine 2% without epinephrine    Patient sedated: no  Scalpel size: 11  Incision type: single straight  Incision depth: dermal  Complexity: simple  Drainage: purulent and viscous  Drainage amount: copious  Wound treatment: wound left open  Patient tolerance: Patient tolerated the procedure well with no immediate complications    Incision depth: dermal      Labs Reviewed - No data to display       Imaging Results    None          Medications   LIDOcaine HCL 20 mg/ml (2%) injection 10 mL (10 mLs Infiltration Given 7/27/23 1353)   traMADoL tablet 50 mg (50 mg Oral Given 7/27/23 1430)     Medical Decision Making:   Initial Assessment:   This patient is an 82-year-old male who comes in with an abscess on his right upper back.  Patient has a history of having an abscess there approximately 3 years ago.  Differential Diagnosis:   Infected sebaceous cyst, abscess  ED Management:  And I and D is performed on the patient's right upper back with a large amount of material drained.  It appears to be more of a sebaceous cyst than an abscess.                         Clinical Impression:   Final diagnoses:  [L72.3, L08.9] Infected sebaceous cyst (Primary)        ED Disposition Condition    Discharge Stable          ED Prescriptions       Medication Sig Dispense Start Date End Date Auth. Provider    sulfamethoxazole-trimethoprim 800-160mg (BACTRIM DS) 800-160 mg Tab Take 1 tablet by mouth 2 (two) times daily. for 10 days 20 tablet 7/27/2023 8/6/2023 Tung Sandoval MD    traMADoL (ULTRAM) 50 mg tablet Take 1 tablet (50 mg total) by mouth every evening. for 12 days 12 tablet 7/27/2023 8/8/2023 Tung Sandoval MD          Follow-up Information       Follow up With Specialties Details Why Contact Info    Haily Chi MD Internal Medicine Schedule an appointment as soon as possible for a visit in 2 days  [address]  [place] [address]  [phone]               Tung Sandoval MD  07/27/23 8349

## 2023-07-27 NOTE — ED NOTES
Pt ambulated to room 1 with steady gait. Pt states he has had a cyst to his upper right shoulder for the last month. Pt has a red abscess approximately 3 inches in size to the right shoulder. Pt rates pain 8/10. The abscess has multiple pus pockets in the cyst. Pt denies needs at this time. MD notified.

## 2023-08-01 ENCOUNTER — TELEPHONE (OUTPATIENT)
Dept: CARDIOLOGY | Facility: CLINIC | Age: 83
End: 2023-08-01
Payer: MEDICARE

## 2023-08-01 NOTE — TELEPHONE ENCOUNTER
Spoke to Joshua to let her know that the patient is cleared for surgery.  ----- Message from Chris Bland MD sent at 8/1/2023  2:19 PM CDT -----  Regarding: RE: [facility]/Joshua  Yes the patient is cleared for procedure  ----- Message -----  From: Mikayla Hardy MA  Sent: 8/1/2023  11:47 AM CDT  To: Chris Bland MD  Subject: FW: [facility]/Joshua                  Please advise is this patient clear for surgery  ----- Message -----  From: Karina Bolton  Sent: 8/1/2023  11:34 AM CDT  To: Edwina BISHOP Staff  Subject: [facility]/Joshua                      States she is calling to check on a cardiac clearance. Please call Joshua [phone]. Thank you

## 2023-08-07 ENCOUNTER — TELEPHONE (OUTPATIENT)
Dept: CARDIOLOGY | Facility: CLINIC | Age: 83
End: 2023-08-07
Payer: MEDICARE

## 2023-08-07 NOTE — TELEPHONE ENCOUNTER
Spoke to Joshua to let her know the patient is cleared for surgery.    ----- Message from Karina Bolton sent at 8/7/2023 10:46 AM CDT -----  Regarding: Joshua/[facility]  States she is calling to see, if he has been cleared for Surgery. Please call Joshua [phone] or fax# [phone]. Thank you

## 2023-08-21 ENCOUNTER — PATIENT MESSAGE (OUTPATIENT)
Dept: VASCULAR SURGERY | Facility: CLINIC | Age: 83
End: 2023-08-21
Payer: MEDICARE

## 2023-08-25 ENCOUNTER — TELEPHONE (OUTPATIENT)
Dept: CARDIOLOGY | Facility: CLINIC | Age: 83
End: 2023-08-25
Payer: MEDICARE

## 2023-08-25 ENCOUNTER — TELEPHONE (OUTPATIENT)
Dept: VASCULAR SURGERY | Facility: CLINIC | Age: 83
End: 2023-08-25
Payer: MEDICARE

## 2023-08-25 NOTE — TELEPHONE ENCOUNTER
Sent another SecureChat to Dr. Washington to approve refill of patient medication. Called to speak with patient. Left message

## 2023-08-25 NOTE — TELEPHONE ENCOUNTER
Pt needing clearance for EGD- to be off blood thinners- pt recently had stents put in with Dr Washington -   Also still in pain from that surgery - pt asking for a phone call to answer some questions related to DR Washington;s surgery            ----- Message from Desiree Hernandez sent at 8/25/2023  9:34 AM CDT -----  Contact: Litzy--Dr. Ansari--[phone]  [email or web address]     Patient is calling for Medical Advice regarding:  Status of clearance. Pt is schedule for 8.29 for a procedure. Please fax to [phone]    Would like response via [employer]:  call     Comments:   Please call back if any questions or concerns.

## 2023-09-15 ENCOUNTER — HOSPITAL ENCOUNTER (OUTPATIENT)
Dept: RADIOLOGY | Facility: HOSPITAL | Age: 83
Discharge: HOME | End: 2023-09-15
Attending: SURGERY
Payer: MEDICARE

## 2023-09-15 DIAGNOSIS — I71.22 AORTIC ARCH ANEURYSM: ICD-10-CM

## 2023-09-15 PROCEDURE — 71275 CT ANGIOGRAPHY CHEST: CPT | Mod: TC

## 2023-09-15 PROCEDURE — 25500020 PHARM REV CODE 255: Performed by: SURGERY

## 2023-09-15 RX ADMIN — IOPAMIDOL 100 ML: 755 INJECTION, SOLUTION INTRAVENOUS at 11:09

## 2023-10-20 ENCOUNTER — PATIENT MESSAGE (OUTPATIENT)
Dept: VASCULAR SURGERY | Facility: CLINIC | Age: 83
End: 2023-10-20
Payer: MEDICARE